# Patient Record
Sex: FEMALE | Race: BLACK OR AFRICAN AMERICAN | NOT HISPANIC OR LATINO | URBAN - METROPOLITAN AREA
[De-identification: names, ages, dates, MRNs, and addresses within clinical notes are randomized per-mention and may not be internally consistent; named-entity substitution may affect disease eponyms.]

---

## 2023-04-16 ENCOUNTER — EMERGENCY (EMERGENCY)
Facility: HOSPITAL | Age: 43
LOS: 1 days | Discharge: HOME | End: 2023-04-16
Admitting: EMERGENCY MEDICINE
Payer: OTHER MISCELLANEOUS

## 2023-04-16 VITALS
HEART RATE: 81 BPM | DIASTOLIC BLOOD PRESSURE: 81 MMHG | RESPIRATION RATE: 16 BRPM | SYSTOLIC BLOOD PRESSURE: 147 MMHG | TEMPERATURE: 98 F | OXYGEN SATURATION: 100 %

## 2023-04-16 VITALS — OXYGEN SATURATION: 99 % | RESPIRATION RATE: 18 BRPM

## 2023-04-16 PROCEDURE — 72100 X-RAY EXAM L-S SPINE 2/3 VWS: CPT | Mod: 26

## 2023-04-16 PROCEDURE — 73552 X-RAY EXAM OF FEMUR 2/>: CPT | Mod: 26,LT

## 2023-04-16 PROCEDURE — 73564 X-RAY EXAM KNEE 4 OR MORE: CPT | Mod: 26,LT

## 2023-04-16 PROCEDURE — 99284 EMERGENCY DEPT VISIT MOD MDM: CPT

## 2023-04-16 RX ORDER — CYCLOBENZAPRINE HYDROCHLORIDE 10 MG/1
1 TABLET, FILM COATED ORAL
Qty: 15 | Refills: 0
Start: 2023-04-16

## 2023-04-16 RX ORDER — IBUPROFEN 200 MG
1 TABLET ORAL
Qty: 30 | Refills: 0
Start: 2023-04-16

## 2023-04-16 RX ORDER — IBUPROFEN 200 MG
800 TABLET ORAL ONCE
Refills: 0 | Status: COMPLETED | OUTPATIENT
Start: 2023-04-16 | End: 2023-04-16

## 2023-04-16 RX ADMIN — Medication 800 MILLIGRAM(S): at 19:51

## 2023-04-16 NOTE — ED PROVIDER NOTE - CLINICAL SUMMARY MEDICAL DECISION MAKING FREE TEXT BOX
Patient presenting with complaint of left leg pain secondary mechanical fall, sustained a few days prior. Patient endorsing swelling and bruising to leg. Symptoms likely due to contusion, injuries concerning for fracture. will provided analgesic, xray of knee, femur, lspine. if imaging negative. patient will be educated on contusion management.

## 2023-04-16 NOTE — ED ADULT TRIAGE NOTE - CHIEF COMPLAINT QUOTE
Pt ambulatory to triage c/o L knee and lower back pain s/p mechanical fall on Thursday. Pt denies hitting head/LOC. Pt denies blood thinner use.

## 2023-04-16 NOTE — ED PROVIDER NOTE - PATIENT PORTAL LINK FT
You can access the FollowMyHealth Patient Portal offered by [facility] by registering at the following website: [email or web address]. By joining [employer]’s FollowMyHealth portal, you will also be able to view your health information using other applications (apps) compatible with our system.

## 2023-04-16 NOTE — ED PROVIDER NOTE - ADDITIONAL NOTES AND INSTRUCTIONS:
Please excuse the absence of MsSony Marie. She was evaluated in the emergency room. She may return to work on 4/2/2023. Please excuse the absence of MsSony Marie. She was evaluated in the emergency room. She may return to work on 4/24/2023.

## 2023-04-16 NOTE — ED PROVIDER NOTE - MUSCULOSKELETAL, MLM
left knee: tenderness and swelling to th peripatellar region. pain wit flexion/extension. antalgic gait. tenderness to the lateral aspect of femur. tenderness to the lumbosacral region. no midline spinal tenderness. Spine appears normal, range of motion is not limited, no muscle or joint tenderness

## 2023-04-16 NOTE — ED PROVIDER NOTE - OBJECTIVE STATEMENT
patient is a 42-year-old female with past medical history presenting for evaluation of left leg pain, lower back pain secondary slip and fall sustained a few days prior. She endorses swelling, bruising of the left knee and thigh. She endorses pain with ambulation, and difficulty walking. She denies standing and head trauma, chest pain, abdominal pain or any other extremity pain